# Patient Record
Sex: MALE | Race: ASIAN | NOT HISPANIC OR LATINO | URBAN - METROPOLITAN AREA
[De-identification: names, ages, dates, MRNs, and addresses within clinical notes are randomized per-mention and may not be internally consistent; named-entity substitution may affect disease eponyms.]

---

## 2019-05-20 ENCOUNTER — EMERGENCY (EMERGENCY)
Facility: HOSPITAL | Age: 64
LOS: 1 days | Discharge: HOME | End: 2019-05-20
Attending: EMERGENCY MEDICINE | Admitting: INTERNAL MEDICINE
Payer: SELF-PAY

## 2019-05-20 VITALS
DIASTOLIC BLOOD PRESSURE: 87 MMHG | TEMPERATURE: 99 F | SYSTOLIC BLOOD PRESSURE: 144 MMHG | RESPIRATION RATE: 16 BRPM | HEART RATE: 85 BPM | OXYGEN SATURATION: 99 %

## 2019-05-20 PROCEDURE — 99285 EMERGENCY DEPT VISIT HI MDM: CPT | Mod: 25

## 2019-05-20 PROCEDURE — 99053 MED SERV 10PM-8AM 24 HR FAC: CPT

## 2019-05-20 NOTE — ED ADULT TRIAGE NOTE - CHIEF COMPLAINT QUOTE
brought in by daughter for irregular heart rate and intermittent sob x a few weeks. pt denies any symptoms. has some edema to b/l LE. hx HTN.

## 2019-05-21 VITALS — HEART RATE: 65 BPM | SYSTOLIC BLOOD PRESSURE: 170 MMHG | DIASTOLIC BLOOD PRESSURE: 99 MMHG

## 2019-05-21 DIAGNOSIS — I10 ESSENTIAL (PRIMARY) HYPERTENSION: ICD-10-CM

## 2019-05-21 DIAGNOSIS — I49.9 CARDIAC ARRHYTHMIA, UNSPECIFIED: ICD-10-CM

## 2019-05-21 DIAGNOSIS — Z29.9 ENCOUNTER FOR PROPHYLACTIC MEASURES, UNSPECIFIED: ICD-10-CM

## 2019-05-21 DIAGNOSIS — R07.9 CHEST PAIN, UNSPECIFIED: ICD-10-CM

## 2019-05-21 LAB
ALBUMIN SERPL ELPH-MCNC: 3.9 G/DL — SIGNIFICANT CHANGE UP (ref 3.3–5)
ALP SERPL-CCNC: 106 U/L — SIGNIFICANT CHANGE UP (ref 40–120)
ALT FLD-CCNC: 23 U/L — SIGNIFICANT CHANGE UP (ref 4–41)
ANION GAP SERPL CALC-SCNC: 8 MMO/L — SIGNIFICANT CHANGE UP (ref 7–14)
APTT BLD: 28.2 SEC — SIGNIFICANT CHANGE UP (ref 27.5–36.3)
AST SERPL-CCNC: 23 U/L — SIGNIFICANT CHANGE UP (ref 4–40)
BASE EXCESS BLDV CALC-SCNC: 4.2 MMOL/L — SIGNIFICANT CHANGE UP
BASOPHILS # BLD AUTO: 0.05 K/UL — SIGNIFICANT CHANGE UP (ref 0–0.2)
BASOPHILS NFR BLD AUTO: 1 % — SIGNIFICANT CHANGE UP (ref 0–2)
BILIRUB SERPL-MCNC: 0.3 MG/DL — SIGNIFICANT CHANGE UP (ref 0.2–1.2)
BLOOD GAS VENOUS - CREATININE: 0.73 MG/DL — SIGNIFICANT CHANGE UP (ref 0.5–1.3)
BLOOD GAS VENOUS - FIO2: 21 — SIGNIFICANT CHANGE UP
BUN SERPL-MCNC: 12 MG/DL — SIGNIFICANT CHANGE UP (ref 7–23)
CALCIUM SERPL-MCNC: 9.2 MG/DL — SIGNIFICANT CHANGE UP (ref 8.4–10.5)
CHLORIDE BLDV-SCNC: 104 MMOL/L — SIGNIFICANT CHANGE UP (ref 96–108)
CHLORIDE SERPL-SCNC: 105 MMOL/L — SIGNIFICANT CHANGE UP (ref 98–107)
CO2 SERPL-SCNC: 27 MMOL/L — SIGNIFICANT CHANGE UP (ref 22–31)
CREAT SERPL-MCNC: 0.78 MG/DL — SIGNIFICANT CHANGE UP (ref 0.5–1.3)
EOSINOPHIL # BLD AUTO: 0.12 K/UL — SIGNIFICANT CHANGE UP (ref 0–0.5)
EOSINOPHIL NFR BLD AUTO: 2.3 % — SIGNIFICANT CHANGE UP (ref 0–6)
GAS PNL BLDV: 140 MMOL/L — SIGNIFICANT CHANGE UP (ref 136–146)
GLUCOSE BLDV-MCNC: 100 MG/DL — HIGH (ref 70–99)
GLUCOSE SERPL-MCNC: 106 MG/DL — HIGH (ref 70–99)
HCO3 BLDV-SCNC: 26 MMOL/L — SIGNIFICANT CHANGE UP (ref 20–27)
HCT VFR BLD CALC: 37.5 % — LOW (ref 39–50)
HCT VFR BLDV CALC: 40.2 % — SIGNIFICANT CHANGE UP (ref 39–51)
HGB BLD-MCNC: 12.3 G/DL — LOW (ref 13–17)
HGB BLDV-MCNC: 13.1 G/DL — SIGNIFICANT CHANGE UP (ref 13–17)
IMM GRANULOCYTES NFR BLD AUTO: 0.4 % — SIGNIFICANT CHANGE UP (ref 0–1.5)
INR BLD: 1.01 — SIGNIFICANT CHANGE UP (ref 0.88–1.17)
LACTATE BLDV-MCNC: 1.1 MMOL/L — SIGNIFICANT CHANGE UP (ref 0.5–2)
LYMPHOCYTES # BLD AUTO: 1.88 K/UL — SIGNIFICANT CHANGE UP (ref 1–3.3)
LYMPHOCYTES # BLD AUTO: 35.8 % — SIGNIFICANT CHANGE UP (ref 13–44)
MCHC RBC-ENTMCNC: 29.4 PG — SIGNIFICANT CHANGE UP (ref 27–34)
MCHC RBC-ENTMCNC: 32.8 % — SIGNIFICANT CHANGE UP (ref 32–36)
MCV RBC AUTO: 89.5 FL — SIGNIFICANT CHANGE UP (ref 80–100)
MONOCYTES # BLD AUTO: 0.64 K/UL — SIGNIFICANT CHANGE UP (ref 0–0.9)
MONOCYTES NFR BLD AUTO: 12.2 % — SIGNIFICANT CHANGE UP (ref 2–14)
NEUTROPHILS # BLD AUTO: 2.54 K/UL — SIGNIFICANT CHANGE UP (ref 1.8–7.4)
NEUTROPHILS NFR BLD AUTO: 48.3 % — SIGNIFICANT CHANGE UP (ref 43–77)
NRBC # FLD: 0.05 K/UL — SIGNIFICANT CHANGE UP (ref 0–0)
NRBC FLD-RTO: 1 — SIGNIFICANT CHANGE UP
NT-PROBNP SERPL-SCNC: 58.27 PG/ML — SIGNIFICANT CHANGE UP
PCO2 BLDV: 53 MMHG — HIGH (ref 41–51)
PH BLDV: 7.37 PH — SIGNIFICANT CHANGE UP (ref 7.32–7.43)
PLATELET # BLD AUTO: 218 K/UL — SIGNIFICANT CHANGE UP (ref 150–400)
PMV BLD: 10.4 FL — SIGNIFICANT CHANGE UP (ref 7–13)
PO2 BLDV: 26 MMHG — LOW (ref 35–40)
POTASSIUM BLDV-SCNC: 4.8 MMOL/L — HIGH (ref 3.4–4.5)
POTASSIUM SERPL-MCNC: 5 MMOL/L — SIGNIFICANT CHANGE UP (ref 3.5–5.3)
POTASSIUM SERPL-SCNC: 5 MMOL/L — SIGNIFICANT CHANGE UP (ref 3.5–5.3)
PROT SERPL-MCNC: 6.8 G/DL — SIGNIFICANT CHANGE UP (ref 6–8.3)
PROTHROM AB SERPL-ACNC: 11.5 SEC — SIGNIFICANT CHANGE UP (ref 9.8–13.1)
RBC # BLD: 4.19 M/UL — LOW (ref 4.2–5.8)
RBC # FLD: 12.5 % — SIGNIFICANT CHANGE UP (ref 10.3–14.5)
SAO2 % BLDV: 43.5 % — LOW (ref 60–85)
SODIUM SERPL-SCNC: 140 MMOL/L — SIGNIFICANT CHANGE UP (ref 135–145)
TROPONIN T, HIGH SENSITIVITY: < 6 NG/L — SIGNIFICANT CHANGE UP (ref ?–14)
WBC # BLD: 5.25 K/UL — SIGNIFICANT CHANGE UP (ref 3.8–10.5)
WBC # FLD AUTO: 5.25 K/UL — SIGNIFICANT CHANGE UP (ref 3.8–10.5)

## 2019-05-21 RX ORDER — LOSARTAN POTASSIUM 100 MG/1
25 TABLET, FILM COATED ORAL ONCE
Refills: 0 | Status: COMPLETED | OUTPATIENT
Start: 2019-05-21 | End: 2019-05-21

## 2019-05-21 RX ORDER — ASPIRIN/CALCIUM CARB/MAGNESIUM 324 MG
324 TABLET ORAL ONCE
Refills: 0 | Status: COMPLETED | OUTPATIENT
Start: 2019-05-21 | End: 2019-05-21

## 2019-05-21 RX ADMIN — Medication 324 MILLIGRAM(S): at 02:39

## 2019-05-21 RX ADMIN — LOSARTAN POTASSIUM 25 MILLIGRAM(S): 100 TABLET, FILM COATED ORAL at 08:39

## 2019-05-21 NOTE — H&P ADULT - ATTENDING COMMENTS
patient is a 63-year-old male who was brought in by his daughter who is a registered nurse for cardiac evaluation.  As per the patient, patient states that his daughter checked his blood pressure and pulse at home and noticed that it was "irregular"and with concerns of PVCs.  Patient denies any complaints of chest pain, shortness of breath, or palpitations.  Patient states that he did experience transient chest discomfort 2 months ago which lasted a few seconds and resolve spontaneously.  Since then patient has not had any recurrent symptoms of chest pain.  Patient's daughter wants the patient to have a cardiac evaluation.  However it is difficult for me to justify a cardiac evaluation when the patient is denying any complaints. patient is awake alert and oriented ×3 and has mental capacity to make decisions and does not want to do an unnecessary cardiac workup either. Patient's telemetry monitor has been unremarkable as well the lab workup.  At this time no further cardiac workup is indicated.

## 2019-05-21 NOTE — ED ADULT NURSE NOTE - OBJECTIVE STATEMENT
Patient is received in trauma C with complaints of irregular heart rate and bilateral edema of lower extremities, also verbalizes occasional chest pain. On arrival, patient is alert and oriented x 4, normal sinus rhythm on cardiac monitor, respirations are even and unlabored, nonpitting edema noted on bilateral lower extremities, abdomen is soft and nontender. Family is at the bedside. Will follow up.

## 2019-05-21 NOTE — DISCHARGE NOTE PROVIDER - NSDCCPCAREPLAN_GEN_ALL_CORE_FT
PRINCIPAL DISCHARGE DIAGNOSIS  Diagnosis: Irregular heart rate  Assessment and Plan of Treatment: Your workup in the ER was normal. Follow up with Dr. Ferrer when your insurance kicks in to undergo echo and Holter.

## 2019-05-21 NOTE — H&P ADULT - NEUROLOGICAL DETAILS
responds to pain/sensation intact/cranial nerves intact/normal strength/alert and oriented x 3/responds to verbal commands

## 2019-05-21 NOTE — DISCHARGE NOTE NURSING/CASE MANAGEMENT/SOCIAL WORK - NSDCDPATPORTLINK_GEN_ALL_CORE
You can access the [facility] Patient Portal, offered by [facility], by registering with the following website: [email or web address]

## 2019-05-21 NOTE — ED PROVIDER NOTE - PROGRESS NOTE DETAILS
Pt has been admitted to [facility] for stress and echo.

## 2019-05-21 NOTE — DISCHARGE NOTE PROVIDER - HOSPITAL COURSE
62 y/o male with a PMHx of HTN presents to ED with an irregular pulse. While admitted, EKG was NSR with no ischemic changes. Troponin negative. Tele negative. As per Dr. Ferrer, no objective findings to remain inpatient. Echo and Holter outpatient. Pt medically stable for discharge.

## 2019-05-21 NOTE — H&P ADULT - NEGATIVE CARDIOVASCULAR SYMPTOMS
no palpitations/no peripheral edema/no dyspnea on exertion/no claudication/no chest pain/no orthopnea/no paroxysmal nocturnal dyspnea

## 2019-05-21 NOTE — H&P ADULT - NEGATIVE GASTROINTESTINAL SYMPTOMS
no flatulence/no hematochezia/no nausea/no vomiting/no constipation/no abdominal pain/no diarrhea/no change in bowel habits/no melena

## 2019-05-21 NOTE — H&P ADULT - RS GEN PE MLT RESP DETAILS PC
good air movement/no chest wall tenderness/breath sounds equal/clear to auscultation bilaterally/no wheezes/no rales/no rhonchi/no intercostal retractions/airway patent/respirations non-labored

## 2019-05-21 NOTE — DISCHARGE NOTE PROVIDER - CARE PROVIDER_API CALL
Chacorta Ferrer (DO)  Cardiology; Internal Medicine  [address] [facility], [address]  [place] [address]  Phone: [phone]  Fax: [phone]  Follow Up Time:

## 2019-05-21 NOTE — H&P ADULT - NEGATIVE NEUROLOGICAL SYMPTOMS
no generalized seizures/no vertigo/no headache/no paresthesias/no confusion/no loss of consciousness/no hemiparesis/no tremors/no loss of sensation/no transient paralysis/no weakness/no difficulty walking/no focal seizures/no syncope

## 2019-05-21 NOTE — H&P ADULT - NEGATIVE OPHTHALMOLOGIC SYMPTOMS
no loss of vision R/no blurred vision R/no diplopia/no blurred vision L/no pain R/no loss of vision L/no pain L/no photophobia

## 2019-05-21 NOTE — ED PROVIDER NOTE - CLINICAL SUMMARY MEDICAL DECISION MAKING FREE TEXT BOX
64yo M w/ pmxh of HTN, brought in by daughter for irregular heart beat and left sided chest pressure radiating to the shoulder and left arm. Pt denies any nausea, vomiting, diaphoresis. As per wife, Pt had a similar episode of chest pain about 2 months ago, where he became very diaphoretic but never followed up with any doctor. Denies any hx of DVT, recent travel, fever, chills, nausea, vomiting.  PE is unremarkable, will obtain labs, EKG, CXR and likely admit for stress test and echo

## 2019-05-21 NOTE — ED PROVIDER NOTE - ATTENDING CONTRIBUTION TO CARE
MD Hurley:  I performed a face to face bedside interview with patient regarding history of present illness, review of symptoms and past medical history. I completed an independent physical exam(documented below).  I have discussed patient's plan of care with resident.   I agree with note as stated above, having amended the EMR as needed to reflect my findings. I have discussed the assessment and plan of care.  This includes during the time I functioned as the attending physician for this patient.  PE:  Gen: Alert, NAD  Head: NC, AT,  EOMI, normal lids/conjunctiva  ENT:  normal hearing, patent oropharynx without erythema/exudate  Neck: +supple, no tenderness/meningismus/JVD, +Trachea midline  Chest: no chest wall tenderness, equal chest rise  Pulm: Bilateral BS, normal resp effort, no wheeze/stridor/retractions  CV: RRR, no M/R/G, +dist pulses  Abd: +BS, soft, NT/ND  Rectal: deferred  Mskel: +b/l pitting edema  Skin: no rash  Neuro: AAOx3  MDM:  62yo M w/ pmh of htn (poorly compliant with meds) and has not sought medical attention in decades (per daughter who is a nurse), bib family for concern for cardiac dysrhythmia and sob. Per pt's daughter, today pt complained of b/l LE edema and R hand "tightness".  As daughter spoke with pt and his wife to obtain history of present illness, pt's wife revealed that pt has not been feeling well as of late, including that pt complained of transient chest pain and diaphoresis approx 2 months ago. Pt initially endorsed these symptoms to residents, but during our conversation - pt denied ever feeling cp. Pt is a lifetime smoker (quit approx 4yrs ago).  Denies recent etoh or illicit drug use. Denies fever/chills/cough. ECG showing q waves in Leads III and aVF. No PE risk factors. Labs, cxr, admit for ACS w/u.

## 2019-05-21 NOTE — ED ADULT NURSE NOTE - CHPI ED NUR SYMPTOMS NEG
no back pain/no congestion/no vomiting/no diaphoresis/no fever/no syncope/no shortness of breath/no chills/no dizziness

## 2019-05-21 NOTE — ED PROVIDER NOTE - OBJECTIVE STATEMENT
64yo 62yo M w/ pmxh of HTN, brought in by daughter for irregular heart beat and left sided chest pressure radiating to the shoulder and left arm. Pt denies any nausea, vomiting, diaphoresis. As per wife, Pt had a similar episode of chest pain about 2 months ago, where he became very diaphoretic but never followed up with any doctor. Denies any hx of DVT, recent travel, fever, chills, nausea, vomiting.

## 2019-05-21 NOTE — H&P ADULT - HISTORY OF PRESENT ILLNESS
64 y/o male with a PMHx of HTN presents to ED with an irregular pulse. Patient's daughter is a registered nurse and takes her father's blood pressure and heart rate on a routine basis. Yesterday after her shift at work, she noticed that her father's pulse was irregular. Pt had no symptoms at the time. He does admit to having an episode of chest pain two months ago which was isolated. During the episode yesterday, pt felt in his normal state of health. Pt denies fever, chills, recent travel, headache, dizziness, visual deficits, chest pain, shortness of breath, orthopnea, palpitations, abdominal pain, N/V/D/C, hematochezia, melena, dysuria, hematuria, LOC, syncope, peripheral edema.

## 2019-05-23 RX ORDER — LOSARTAN POTASSIUM 100 MG/1
1 TABLET, FILM COATED ORAL
Qty: 0 | Refills: 0 | DISCHARGE

## 2023-01-23 NOTE — H&P ADULT - ALLERGIC/IMMUNOLOGIC

## 2024-01-11 NOTE — H&P ADULT - NEGATIVE GENERAL SYMPTOMS
Assessment & Plan   Problem List Items Addressed This Visit    None  Visit Diagnoses       Traumatic hematoma of right knee, initial encounter    -  Primary            Gary Bonilla presented with follow up of right knee swelling. Physical exam as well as imaging findings were reviewed. We discussed options including conservative as well as surgical management. At this point, recommend continuing with compression. Tubigrips and ace wraps provided. We discussed surgical evacuation, although there is a risk of wound complications and recurrence with this, as well as risk of anesthesia. He would like to continue with conservative measures. Plan to follow up in 6 weeks.       Problem List:  Problem List Items Addressed This Visit    None  Visit Diagnoses       Traumatic hematoma of right knee, initial encounter    -  Primary               Patient ID: Jason is a 80 year old male.  Chief Complaint   Patient presents with    Office Visit     F/U RIGHT KNEE DOI 12/18/2023       History of Present Illness:  Jason is a 80 year old male who presents for follow up of right knee swelling and hematoma. The hematoma has improved somewhat, although it is still present. Wife reports he has been wearing calf high compression stockings but no compression around his knee. He has been able to ambulate with some discomfort.     History obtained from: patient    Physical exam:  There were no vitals taken for this visit.  General: no apparent distress  Right knee:  Skin: No prior surgical scars  Palpation: Tenderness to palpation not present  Range of motion: 10 to 100  Strength: 5/5 with extension, 5/5 with flexion  Neurovascular: Palpable DP pulse, Palpable PT pulse, Light touch sensation grossly intact    Relevant Results:  Imaging:  No new imaging today      no fatigue/no malaise/no weight loss/no chills/no fever/no sweating/no anorexia/no weight gain

## 2024-07-16 NOTE — H&P ADULT - GASTROINTESTINAL DETAILS
For information on Fall & Injury Prevention, visit: [email or web address] OR  [email or web address] OR  https://www.cdc.gov/steadi/patient.html
nontender/soft/bowel sounds normal/no distention/no masses palpable